# Patient Record
Sex: MALE | Race: ASIAN | NOT HISPANIC OR LATINO
[De-identification: names, ages, dates, MRNs, and addresses within clinical notes are randomized per-mention and may not be internally consistent; named-entity substitution may affect disease eponyms.]

---

## 2022-06-20 PROBLEM — Z00.00 ENCOUNTER FOR PREVENTIVE HEALTH EXAMINATION: Status: ACTIVE | Noted: 2022-06-20

## 2022-06-21 ENCOUNTER — APPOINTMENT (OUTPATIENT)
Dept: CARDIOLOGY | Facility: CLINIC | Age: 77
End: 2022-06-21
Payer: MEDICARE

## 2022-06-21 VITALS
OXYGEN SATURATION: 99 % | HEART RATE: 96 BPM | WEIGHT: 124 LBS | TEMPERATURE: 98.2 F | RESPIRATION RATE: 18 BRPM | SYSTOLIC BLOOD PRESSURE: 164 MMHG | BODY MASS INDEX: 20.17 KG/M2 | DIASTOLIC BLOOD PRESSURE: 78 MMHG | HEIGHT: 65.75 IN

## 2022-06-21 DIAGNOSIS — Z82.49 FAMILY HISTORY OF ISCHEMIC HEART DISEASE AND OTHER DISEASES OF THE CIRCULATORY SYSTEM: ICD-10-CM

## 2022-06-21 DIAGNOSIS — R00.0 TACHYCARDIA, UNSPECIFIED: ICD-10-CM

## 2022-06-21 DIAGNOSIS — R53.83 OTHER FATIGUE: ICD-10-CM

## 2022-06-21 PROCEDURE — 93306 TTE W/DOPPLER COMPLETE: CPT

## 2022-06-21 PROCEDURE — 99203 OFFICE O/P NEW LOW 30 MIN: CPT | Mod: 25

## 2022-06-21 PROCEDURE — 93015 CV STRESS TEST SUPVJ I&R: CPT

## 2022-06-22 PROBLEM — Z82.49 FAMILY HISTORY OF ACUTE MYOCARDIAL INFARCTION: Status: ACTIVE | Noted: 2022-06-22

## 2022-06-22 RX ORDER — AMLODIPINE BESYLATE 5 MG/1
5 TABLET ORAL
Qty: 180 | Refills: 0 | Status: ACTIVE | COMMUNITY
Start: 2022-06-10

## 2022-06-22 NOTE — HISTORY OF PRESENT ILLNESS
[FreeTextEntry1] : 77 year old male w. PMH of HTN presents to establish care. Pt reports increased fatigue after walking extended time for recent year. He used to walk 1 hr without problem. Pt denies CP/SOB/palpitations. Pt denies h/o syncope.\par

## 2022-06-25 PROBLEM — R53.83 FATIGUE: Status: ACTIVE | Noted: 2022-06-25

## 2022-07-04 NOTE — PHYSICAL EXAM
[Well Developed] : well developed [Well Nourished] : well nourished [No Acute Distress] : no acute distress [Normal Conjunctiva] : normal conjunctiva [Normal Venous Pressure] : normal venous pressure [No Carotid Bruit] : no carotid bruit [Normal S1, S2] : normal S1, S2 [No Murmur] : no murmur [No Rub] : no rub [No Gallop] : no gallop [Clear Lung Fields] : clear lung fields [Good Air Entry] : good air entry [Soft] : abdomen soft [No Respiratory Distress] : no respiratory distress  [Non Tender] : non-tender [No Masses/organomegaly] : no masses/organomegaly [Normal Bowel Sounds] : normal bowel sounds [Normal Gait] : normal gait [No Edema] : no edema [No Cyanosis] : no cyanosis [No Clubbing] : no clubbing [No Varicosities] : no varicosities [Moves all extremities] : moves all extremities [No Focal Deficits] : no focal deficits [Normal Speech] : normal speech [Alert and Oriented] : alert and oriented [Normal memory] : normal memory

## 2022-07-06 ENCOUNTER — APPOINTMENT (OUTPATIENT)
Dept: CARDIOLOGY | Facility: CLINIC | Age: 77
End: 2022-07-06

## 2022-07-06 VITALS
BODY MASS INDEX: 20.49 KG/M2 | SYSTOLIC BLOOD PRESSURE: 145 MMHG | WEIGHT: 126 LBS | RESPIRATION RATE: 18 BRPM | HEART RATE: 75 BPM | DIASTOLIC BLOOD PRESSURE: 74 MMHG | TEMPERATURE: 98.3 F | OXYGEN SATURATION: 96 %

## 2022-07-06 PROCEDURE — 99213 OFFICE O/P EST LOW 20 MIN: CPT

## 2022-07-07 NOTE — REASON FOR VISIT
[FreeTextEntry1] : 77 year old male with HTN presents followup.  \par \par Patient was last seen on 6/21/22 to establish care.  Patient underwent an echocardiogram and it showed normal LV function without significant valvular pathology. Patient underwent a treadmill stress test and completed 5.5 minutes of [place] protocol.  There were upsloping ST depressions on ECG but no symptoms.  Following treadmill stress, there was no echocardiographic evidence of ischemia.  His BP was elevated.  I advised patient to start Metoprolol ER 25 mg and continue Amlodipine 5 mg.

## 2022-07-07 NOTE — HISTORY OF PRESENT ILLNESS
[FreeTextEntry1] : 7/6/22-  Patient feels more energetic and can walk more. He is now on 1/2 tab of Metoprolol ER 25 mg. His BP at home is  110-120. Patient has trace edema. FU in 6 months. \par \par 6/21/22 - Pt reports increased fatigue after walking extended time for recent year. He used to walk 1 hr without problem. Pt denies CP/SOB/palpitations. Pt denies h/o syncope.\par

## 2022-11-16 ENCOUNTER — RX RENEWAL (OUTPATIENT)
Age: 77
End: 2022-11-16

## 2022-12-20 ENCOUNTER — APPOINTMENT (OUTPATIENT)
Dept: CARDIOLOGY | Facility: CLINIC | Age: 77
End: 2022-12-20

## 2022-12-20 ENCOUNTER — NON-APPOINTMENT (OUTPATIENT)
Age: 77
End: 2022-12-20

## 2022-12-20 VITALS
RESPIRATION RATE: 18 BRPM | WEIGHT: 125 LBS | SYSTOLIC BLOOD PRESSURE: 149 MMHG | TEMPERATURE: 97.8 F | DIASTOLIC BLOOD PRESSURE: 76 MMHG | OXYGEN SATURATION: 98 % | BODY MASS INDEX: 20.33 KG/M2 | HEART RATE: 87 BPM

## 2022-12-20 PROCEDURE — 93000 ELECTROCARDIOGRAM COMPLETE: CPT

## 2022-12-20 PROCEDURE — 99213 OFFICE O/P EST LOW 20 MIN: CPT | Mod: 25

## 2022-12-20 NOTE — REASON FOR VISIT
[FreeTextEntry1] : 77 year old male with HTN presents followup.  \par \par Patient was last seen on 7/6/22-  Patient felt more energetic and could walk more. \par \par He is on 1/2 tab of Metoprolol ER 25 mg and Amlodipine 5 mg BID for HTN.\par \par Patient underwent an echocardiogram 6/21/22  and it showed normal LV function without significant valvular pathology. \par \par Patient underwent a treadmill stress test 6/21/22 and completed 5.5 minutes of [place] protocol.  There were upsloping ST depressions on ECG but no symptoms.  Following treadmill stress, there was no echocardiographic evidence of ischemia.

## 2022-12-20 NOTE — HISTORY OF PRESENT ILLNESS
[FreeTextEntry1] : 12/20/22 - Patient has been stable.  Patient denies CP, SOB, or palpitations.  His BP was elevated in office but normal at home 120/70.  He exercises daily.  Patient reports positional dizziness.  Patient was not orthostatic (-140-140).  Patient has trace LE edema.  I advised patient to continue current medications.  FU 6 months. \par \par 7/6/22-  Patient feels more energetic and can walk more. He is now on 1/2 tab of Metoprolol ER 25 mg. His BP at home is  110-120. Patient has trace edema. FU in 6 months. \par \par 6/21/22 - Pt reports increased fatigue after walking extended time for recent year. He used to walk 1 hr without problem. Pt denies CP/SOB/palpitations. Pt denies h/o syncope.\par

## 2023-06-24 NOTE — PHYSICAL EXAM
[Well Developed] : well developed [Well Nourished] : well nourished [No Acute Distress] : no acute distress [Normal Conjunctiva] : normal conjunctiva [Normal Venous Pressure] : normal venous pressure [No Carotid Bruit] : no carotid bruit [Normal S1, S2] : normal S1, S2 [No Murmur] : no murmur [No Gallop] : no gallop [No Rub] : no rub [Clear Lung Fields] : clear lung fields [Good Air Entry] : good air entry [No Respiratory Distress] : no respiratory distress  [Soft] : abdomen soft [Non Tender] : non-tender [No Masses/organomegaly] : no masses/organomegaly [Normal Bowel Sounds] : normal bowel sounds [Normal Gait] : normal gait [No Cyanosis] : no cyanosis [No Edema] : no edema [No Clubbing] : no clubbing [No Varicosities] : no varicosities [Moves all extremities] : moves all extremities [Normal Speech] : normal speech [No Focal Deficits] : no focal deficits [Alert and Oriented] : alert and oriented [Normal memory] : normal memory

## 2023-06-24 NOTE — REASON FOR VISIT
[FreeTextEntry1] : 78 year old male with HTN presents followup.  \par \par Patient was last seen on 12/20/22 - Patient has been stable.  Patient denies CP, SOB, or palpitations.  His BP was elevated in office but normal at home 120/70.  He exercises daily.  Patient reports positional dizziness.  Patient was not orthostatic (-140-140).  Patient has trace LE edema.  I advised patient to continue current medications.  FU 6 months. \par \par He is on 1/2 tab of Metoprolol ER 25 mg and Amlodipine 5 mg BID for HTN.\par \par Patient underwent an echocardiogram 6/21/22  and it showed normal LV function without significant valvular pathology. \par \par Patient underwent a treadmill stress test 6/21/22 and completed 5.5 minutes of [place] protocol.  There were upsloping ST depressions on ECG but no symptoms.  Following treadmill stress, there was no echocardiographic evidence of ischemia.

## 2023-06-26 ENCOUNTER — APPOINTMENT (OUTPATIENT)
Dept: CARDIOLOGY | Facility: CLINIC | Age: 78
End: 2023-06-26
Payer: MEDICARE

## 2023-06-26 VITALS
DIASTOLIC BLOOD PRESSURE: 78 MMHG | WEIGHT: 126 LBS | RESPIRATION RATE: 18 BRPM | TEMPERATURE: 97.6 F | SYSTOLIC BLOOD PRESSURE: 154 MMHG | HEART RATE: 63 BPM | BODY MASS INDEX: 20.49 KG/M2 | OXYGEN SATURATION: 98 %

## 2023-06-26 PROCEDURE — 99214 OFFICE O/P EST MOD 30 MIN: CPT

## 2023-11-24 ENCOUNTER — RX RENEWAL (OUTPATIENT)
Age: 78
End: 2023-11-24

## 2023-12-16 NOTE — PHYSICAL EXAM
[Well Developed] : well developed [Well Nourished] : well nourished [No Acute Distress] : no acute distress [Normal Conjunctiva] : normal conjunctiva [Normal Venous Pressure] : normal venous pressure [No Carotid Bruit] : no carotid bruit [Normal S1, S2] : normal S1, S2 [No Murmur] : no murmur [No Rub] : no rub [Clear Lung Fields] : clear lung fields [No Gallop] : no gallop [Good Air Entry] : good air entry [No Respiratory Distress] : no respiratory distress  [Soft] : abdomen soft [Non Tender] : non-tender [No Masses/organomegaly] : no masses/organomegaly [Normal Bowel Sounds] : normal bowel sounds [Normal Gait] : normal gait [No Edema] : no edema [No Cyanosis] : no cyanosis [No Clubbing] : no clubbing [No Varicosities] : no varicosities [Moves all extremities] : moves all extremities [No Focal Deficits] : no focal deficits [Normal Speech] : normal speech [Alert and Oriented] : alert and oriented [Normal memory] : normal memory

## 2023-12-21 ENCOUNTER — APPOINTMENT (OUTPATIENT)
Dept: CARDIOLOGY | Facility: CLINIC | Age: 78
End: 2023-12-21
Payer: MEDICARE

## 2023-12-21 VITALS
BODY MASS INDEX: 20.65 KG/M2 | TEMPERATURE: 96.8 F | DIASTOLIC BLOOD PRESSURE: 87 MMHG | SYSTOLIC BLOOD PRESSURE: 154 MMHG | WEIGHT: 127 LBS | RESPIRATION RATE: 16 BRPM | OXYGEN SATURATION: 96 % | HEART RATE: 89 BPM

## 2023-12-21 PROCEDURE — 99213 OFFICE O/P EST LOW 20 MIN: CPT

## 2023-12-21 NOTE — HISTORY OF PRESENT ILLNESS
[FreeTextEntry1] : 12/20/23 - Patient has been stable.  Patient reports that he may have Parkinson's but he has not seen a neurologist yet.  Patient denies CP, SOB, palpitations, or lightheadedness.  /78 in office but normal at home.  He brought in his home BP machine for verification and it was 150/82.  1+ LE edema on alex and trace on right noted but does not bother him.  He does not want to reduce dose of Amlodipine and add an ARB at this time.  FU 6 months.   6/26/23 - Patient has been stable.  Patient reports improved exercise tolerance.  Patient denies CP, SOB, or palpitations.  Patient reports increased ankle swelling recently.  BP elevated today.  1+ LE edema on alex and trace on right.  I advised patient to continue current medications.  FU 6 months.   12/20/22 - Patient has been stable.  Patient denies CP, SOB, or palpitations.  His BP was elevated in office but normal at home 120/70.  He exercises daily.  Patient reports positional dizziness.  Patient was not orthostatic (-140-140).  Patient has trace LE edema.  I advised patient to continue current medications.  FU 6 months.   7/6/22-  Patient feels more energetic and can walk more. He is now on 1/2 tab of Metoprolol ER 25 mg. His BP at home is  110-120. Patient has trace edema. FU in 6 months.   6/21/22 - Pt reports increased fatigue after walking extended time for recent year. He used to walk 1 hr without problem. Pt denies CP/SOB/palpitations. Pt denies h/o syncope.

## 2024-05-20 RX ORDER — METOPROLOL SUCCINATE 25 MG/1
25 TABLET, EXTENDED RELEASE ORAL
Qty: 15 | Refills: 5 | Status: ACTIVE | COMMUNITY
Start: 2022-06-21 | End: 1900-01-01

## 2024-06-22 PROBLEM — I10 HYPERTENSION: Status: ACTIVE | Noted: 2022-06-22

## 2024-06-22 PROBLEM — R60.0 LOCALIZED EDEMA: Status: ACTIVE | Noted: 2023-12-16

## 2024-06-26 ENCOUNTER — NON-APPOINTMENT (OUTPATIENT)
Age: 79
End: 2024-06-26

## 2024-06-26 ENCOUNTER — APPOINTMENT (OUTPATIENT)
Dept: CARDIOLOGY | Facility: CLINIC | Age: 79
End: 2024-06-26
Payer: MEDICARE

## 2024-06-26 VITALS
BODY MASS INDEX: 20.49 KG/M2 | DIASTOLIC BLOOD PRESSURE: 74 MMHG | RESPIRATION RATE: 18 BRPM | WEIGHT: 126 LBS | HEART RATE: 75 BPM | TEMPERATURE: 97 F | SYSTOLIC BLOOD PRESSURE: 161 MMHG | OXYGEN SATURATION: 97 %

## 2024-06-26 DIAGNOSIS — R60.0 LOCALIZED EDEMA: ICD-10-CM

## 2024-06-26 DIAGNOSIS — I10 ESSENTIAL (PRIMARY) HYPERTENSION: ICD-10-CM

## 2024-06-26 PROCEDURE — 93000 ELECTROCARDIOGRAM COMPLETE: CPT

## 2024-06-26 PROCEDURE — 99214 OFFICE O/P EST MOD 30 MIN: CPT | Mod: 25

## 2024-06-29 NOTE — REASON FOR VISIT
[FreeTextEntry1] : 79 year-old male with HTN presents followup.    Patient was last seen on 12/20/23 - Patient has been stable.  Patient reports that he may have Parkinson's but he has not seen a neurologist yet.  Patient denies CP, SOB, palpitations, or lightheadedness.  /78 in office but normal at home.  He brought in his home BP machine for verification and it was 150/82.  1+ LE edema on alex and trace on right noted but does not bother him.  He does not want to reduce dose of Amlodipine and add an ARB at this time.  FU 6 months.    He is on 1/2 tab of Metoprolol ER 25 mg and Amlodipine 5 mg BID for HTN.  Patient underwent an echocardiogram 6/21/22  and it showed normal LV function without significant valvular pathology.   Patient underwent a treadmill stress test 6/21/22 and completed 5.5 minutes of [place] protocol.  There were upsloping ST depressions on ECG but no symptoms.  Following treadmill stress, there was no echocardiographic evidence of ischemia.

## 2024-12-23 ENCOUNTER — APPOINTMENT (OUTPATIENT)
Dept: CARDIOLOGY | Facility: CLINIC | Age: 79
End: 2024-12-23
Payer: MEDICARE

## 2024-12-23 VITALS
OXYGEN SATURATION: 99 % | HEART RATE: 76 BPM | WEIGHT: 122 LBS | DIASTOLIC BLOOD PRESSURE: 77 MMHG | RESPIRATION RATE: 18 BRPM | SYSTOLIC BLOOD PRESSURE: 154 MMHG | BODY MASS INDEX: 19.84 KG/M2

## 2024-12-23 DIAGNOSIS — R60.0 LOCALIZED EDEMA: ICD-10-CM

## 2024-12-23 DIAGNOSIS — I10 ESSENTIAL (PRIMARY) HYPERTENSION: ICD-10-CM

## 2024-12-23 PROCEDURE — 93306 TTE W/DOPPLER COMPLETE: CPT

## 2024-12-23 PROCEDURE — 99214 OFFICE O/P EST MOD 30 MIN: CPT

## 2024-12-23 RX ORDER — VALSARTAN 80 MG/1
80 TABLET, COATED ORAL
Qty: 90 | Refills: 1 | Status: ACTIVE | COMMUNITY
Start: 2024-12-23 | End: 1900-01-01

## 2025-02-04 ENCOUNTER — RESULT REVIEW (OUTPATIENT)
Age: 80
End: 2025-02-04

## 2025-02-17 ENCOUNTER — RX RENEWAL (OUTPATIENT)
Age: 80
End: 2025-02-17

## 2025-03-26 ENCOUNTER — APPOINTMENT (OUTPATIENT)
Dept: CARDIOLOGY | Facility: CLINIC | Age: 80
End: 2025-03-26
Payer: MEDICARE

## 2025-03-26 VITALS
OXYGEN SATURATION: 100 % | RESPIRATION RATE: 18 BRPM | WEIGHT: 121 LBS | HEART RATE: 56 BPM | DIASTOLIC BLOOD PRESSURE: 75 MMHG | SYSTOLIC BLOOD PRESSURE: 144 MMHG | BODY MASS INDEX: 19.68 KG/M2

## 2025-03-26 DIAGNOSIS — R60.0 LOCALIZED EDEMA: ICD-10-CM

## 2025-03-26 DIAGNOSIS — I10 ESSENTIAL (PRIMARY) HYPERTENSION: ICD-10-CM

## 2025-03-26 PROCEDURE — 99214 OFFICE O/P EST MOD 30 MIN: CPT

## 2025-06-25 ENCOUNTER — APPOINTMENT (OUTPATIENT)
Dept: CARDIOLOGY | Facility: CLINIC | Age: 80
End: 2025-06-25
Payer: MEDICARE

## 2025-06-25 VITALS
DIASTOLIC BLOOD PRESSURE: 72 MMHG | SYSTOLIC BLOOD PRESSURE: 156 MMHG | RESPIRATION RATE: 18 BRPM | HEART RATE: 64 BPM | WEIGHT: 120 LBS | OXYGEN SATURATION: 98 % | BODY MASS INDEX: 19.52 KG/M2

## 2025-06-25 PROCEDURE — 93000 ELECTROCARDIOGRAM COMPLETE: CPT

## 2025-06-25 PROCEDURE — 99214 OFFICE O/P EST MOD 30 MIN: CPT | Mod: 25
